# Patient Record
Sex: FEMALE | Race: WHITE | HISPANIC OR LATINO | URBAN - METROPOLITAN AREA
[De-identification: names, ages, dates, MRNs, and addresses within clinical notes are randomized per-mention and may not be internally consistent; named-entity substitution may affect disease eponyms.]

---

## 2017-02-22 ENCOUNTER — EMERGENCY (EMERGENCY)
Facility: HOSPITAL | Age: 67
LOS: 1 days | End: 2017-02-22
Payer: MEDICAID

## 2017-02-22 PROCEDURE — 71010: CPT | Mod: 26

## 2017-02-22 PROCEDURE — 72125 CT NECK SPINE W/O DYE: CPT | Mod: 26

## 2017-02-22 PROCEDURE — 70450 CT HEAD/BRAIN W/O DYE: CPT | Mod: 26

## 2017-02-22 PROCEDURE — 99285 EMERGENCY DEPT VISIT HI MDM: CPT

## 2017-02-23 ENCOUNTER — OUTPATIENT (OUTPATIENT)
Dept: OUTPATIENT SERVICES | Facility: HOSPITAL | Age: 67
LOS: 1 days | End: 2017-02-23

## 2017-02-23 PROCEDURE — 93880 EXTRACRANIAL BILAT STUDY: CPT | Mod: 26

## 2017-07-10 ENCOUNTER — OUTPATIENT (OUTPATIENT)
Dept: OUTPATIENT SERVICES | Facility: HOSPITAL | Age: 67
LOS: 1 days | End: 2017-07-10
Payer: MEDICAID

## 2017-07-10 PROCEDURE — 76700 US EXAM ABDOM COMPLETE: CPT | Mod: 26

## 2017-08-21 ENCOUNTER — OUTPATIENT (OUTPATIENT)
Dept: OUTPATIENT SERVICES | Facility: HOSPITAL | Age: 67
LOS: 1 days | End: 2017-08-21
Payer: MEDICAID

## 2017-08-21 PROCEDURE — G0202: CPT | Mod: 26

## 2017-08-31 ENCOUNTER — OUTPATIENT (OUTPATIENT)
Dept: OUTPATIENT SERVICES | Facility: HOSPITAL | Age: 67
LOS: 1 days | End: 2017-08-31

## 2017-08-31 ENCOUNTER — INPATIENT (INPATIENT)
Facility: HOSPITAL | Age: 67
LOS: 2 days | Discharge: HOME | End: 2017-09-03
Payer: MEDICARE

## 2017-08-31 PROCEDURE — 99285 EMERGENCY DEPT VISIT HI MDM: CPT

## 2017-08-31 PROCEDURE — 71020: CPT | Mod: 26

## 2017-09-01 ENCOUNTER — OUTPATIENT (OUTPATIENT)
Dept: OUTPATIENT SERVICES | Facility: HOSPITAL | Age: 67
LOS: 1 days | End: 2017-09-01

## 2017-09-02 ENCOUNTER — OUTPATIENT (OUTPATIENT)
Dept: OUTPATIENT SERVICES | Facility: HOSPITAL | Age: 67
LOS: 1 days | End: 2017-09-02

## 2017-09-03 ENCOUNTER — OUTPATIENT (OUTPATIENT)
Dept: OUTPATIENT SERVICES | Facility: HOSPITAL | Age: 67
LOS: 1 days | End: 2017-09-03

## 2018-11-17 ENCOUNTER — EMERGENCY (EMERGENCY)
Facility: HOSPITAL | Age: 68
LOS: 1 days | End: 2018-11-17
Payer: MEDICARE

## 2018-11-17 PROCEDURE — 71045 X-RAY EXAM CHEST 1 VIEW: CPT | Mod: 26

## 2018-11-17 PROCEDURE — 93971 EXTREMITY STUDY: CPT | Mod: 26,LT

## 2018-11-17 PROCEDURE — 99285 EMERGENCY DEPT VISIT HI MDM: CPT | Mod: 25

## 2020-07-06 ENCOUNTER — APPOINTMENT (OUTPATIENT)
Dept: ULTRASOUND IMAGING | Facility: CLINIC | Age: 70
End: 2020-07-06
Payer: MEDICARE

## 2020-07-06 PROCEDURE — 76775 US EXAM ABDO BACK WALL LIM: CPT

## 2022-11-25 ENCOUNTER — APPOINTMENT (OUTPATIENT)
Dept: ULTRASOUND IMAGING | Facility: CLINIC | Age: 72
End: 2022-11-25

## 2022-11-25 ENCOUNTER — APPOINTMENT (OUTPATIENT)
Dept: MAMMOGRAPHY | Facility: CLINIC | Age: 72
End: 2022-11-25

## 2022-11-25 PROCEDURE — 77067 SCR MAMMO BI INCL CAD: CPT

## 2022-11-25 PROCEDURE — 76641 ULTRASOUND BREAST COMPLETE: CPT | Mod: 50

## 2022-11-25 PROCEDURE — 77063 BREAST TOMOSYNTHESIS BI: CPT

## 2023-05-26 ENCOUNTER — APPOINTMENT (OUTPATIENT)
Dept: ULTRASOUND IMAGING | Facility: CLINIC | Age: 73
End: 2023-05-26
Payer: MEDICARE

## 2023-05-26 PROCEDURE — 76642 ULTRASOUND BREAST LIMITED: CPT | Mod: RT

## 2023-06-08 ENCOUNTER — RX ONLY (RX ONLY)
Age: 73
End: 2023-06-08

## 2023-06-08 ENCOUNTER — OFFICE (OUTPATIENT)
Dept: URBAN - METROPOLITAN AREA CLINIC 38 | Facility: CLINIC | Age: 73
Setting detail: OPHTHALMOLOGY
End: 2023-06-08
Payer: MEDICAID

## 2023-06-08 VITALS — HEIGHT: 60 IN | BODY MASS INDEX: 26.31 KG/M2 | WEIGHT: 134 LBS

## 2023-06-08 DIAGNOSIS — H17.9: ICD-10-CM

## 2023-06-08 DIAGNOSIS — Z96.1: ICD-10-CM

## 2023-06-08 DIAGNOSIS — E11.3293: ICD-10-CM

## 2023-06-08 DIAGNOSIS — H11.042: ICD-10-CM

## 2023-06-08 DIAGNOSIS — H04.123: ICD-10-CM

## 2023-06-08 DIAGNOSIS — H26.491: ICD-10-CM

## 2023-06-08 DIAGNOSIS — H35.033: ICD-10-CM

## 2023-06-08 PROCEDURE — 92014 COMPRE OPH EXAM EST PT 1/>: CPT | Performed by: OPHTHALMOLOGY

## 2023-06-08 ASSESSMENT — REFRACTION_MANIFEST
OD_VA2: 20/25(J1)
OS_VA2: 20/25(J1)
OD_AXIS: 180
OS_AXIS: 170
OS_SPHERE: +0.50
OS_CYLINDER: -1.25
OS_VA2: 20/25(J1)
OD_CYLINDER: -1.75
OD_CYLINDER: -1.75
OU_VA: 20/20-
OS_VA1: 20/25-
OD_VA1: 20/40+2
OS_CYLINDER: -1.25
OD_ADD: +2.75
OD_SPHERE: +0.50
OD_ADD: +2.50
OS_SPHERE: +0.50
OS_ADD: +2.75
OS_AXIS: 175
OD_VA2: 20/25(J1)
OD_SPHERE: +0.75
OS_VA1: 20/25
OS_ADD: +2.50
OD_VA1: 20/40-
OD_AXIS: 005

## 2023-06-08 ASSESSMENT — CONFRONTATIONAL VISUAL FIELD TEST (CVF)
OS_FINDINGS: FULL
OD_FINDINGS: FULL

## 2023-06-08 ASSESSMENT — REFRACTION_CURRENTRX
OD_SPHERE: +0.50
OS_SPHERE: +0.50
OD_OVR_VA: 20/
OD_ADD: +2.75
OS_ADD: +2.75
OD_VPRISM_DIRECTION: BF
OD_CYLINDER: -1.75
OS_OVR_VA: 20/
OS_VPRISM_DIRECTION: BF
OS_AXIS: 178
OS_CYLINDER: -1.25
OD_AXIS: 010

## 2023-06-08 ASSESSMENT — REFRACTION_AUTOREFRACTION
OS_AXIS: 169
OD_SPHERE: +0.75
OD_CYLINDER: -2.50
OS_SPHERE: +1.00
OS_CYLINDER: -2.00
OD_AXIS: 180

## 2023-06-08 ASSESSMENT — KERATOMETRY
OD_AXISANGLE_DEGREES: 096
OS_K1POWER_DIOPTERS: 42.50
METHOD_AUTO_MANUAL: AUTO
OS_AXISANGLE_DEGREES: 082
OD_K1POWER_DIOPTERS: 42.75
OS_K2POWER_DIOPTERS: 44.75
OD_K2POWER_DIOPTERS: 45.00

## 2023-06-08 ASSESSMENT — SPHEQUIV_DERIVED
OS_SPHEQUIV: -0.125
OS_SPHEQUIV: -0.125
OD_SPHEQUIV: -0.125
OD_SPHEQUIV: -0.5
OD_SPHEQUIV: -0.375
OS_SPHEQUIV: 0

## 2023-06-08 ASSESSMENT — CORNEAL PTERYGIUM: OS_PTERYGIUM: NASAL 2MM 1MM

## 2023-06-08 ASSESSMENT — VISUAL ACUITY
OS_BCVA: 20/40
OD_BCVA: 20/25-

## 2023-06-08 ASSESSMENT — AXIALLENGTH_DERIVED
OD_AL: 23.6003
OS_AL: 23.5947
OS_AL: 23.5947
OS_AL: 23.5461
OD_AL: 23.5032
OD_AL: 23.6491

## 2023-06-08 ASSESSMENT — CORNEAL SURGICAL SCARRING: OS_SCARRING: ENDOTHELIAL

## 2023-06-08 ASSESSMENT — SUPERFICIAL PUNCTATE KERATITIS (SPK)
OS_SPK: T 1+
OD_SPK: T 1+

## 2023-07-07 ENCOUNTER — OFFICE (OUTPATIENT)
Dept: URBAN - METROPOLITAN AREA CLINIC 38 | Facility: CLINIC | Age: 73
Setting detail: OPHTHALMOLOGY
End: 2023-07-07
Payer: MEDICAID

## 2023-07-07 DIAGNOSIS — H01.004: ICD-10-CM

## 2023-07-07 DIAGNOSIS — H01.001: ICD-10-CM

## 2023-07-07 DIAGNOSIS — Z96.1: ICD-10-CM

## 2023-07-07 DIAGNOSIS — H16.223: ICD-10-CM

## 2023-07-07 DIAGNOSIS — H52.4: ICD-10-CM

## 2023-07-07 DIAGNOSIS — H11.042: ICD-10-CM

## 2023-07-07 PROCEDURE — 99213 OFFICE O/P EST LOW 20 MIN: CPT

## 2023-07-07 PROCEDURE — 92015 DETERMINE REFRACTIVE STATE: CPT

## 2023-07-07 ASSESSMENT — SPHEQUIV_DERIVED
OD_SPHEQUIV: -0.375
OS_SPHEQUIV: -0.375
OS_SPHEQUIV: -0.375
OD_SPHEQUIV: -0.875
OD_SPHEQUIV: -0.375
OS_SPHEQUIV: -0.25

## 2023-07-07 ASSESSMENT — REFRACTION_MANIFEST
OS_SPHERE: +0.25
OS_VA2: 20/25(J1)
OD_VA2: 20/25(J1)
OS_AXIS: 175
OD_AXIS: 180
OU_VA: 20/30+2
OD_ADD: +2.75
OD_SPHERE: +0.50
OS_SPHERE: +0.25
OD_CYLINDER: -1.75
OS_ADD: +2.75
OD_ADD: +2.75
OS_CYLINDER: -1.25
OU_VA: 20/30+2
OD_VA2: 20/25(J1)
OD_VA1: 20/40
OS_VA1: 20/30-2
OS_AXIS: 175
OD_SPHERE: +0.50
OS_ADD: +2.75
OS_VA1: 20/30-2
OS_VA2: 20/25(J1)
OD_AXIS: 180
OS_CYLINDER: -1.25
OD_CYLINDER: -1.75
OD_VA1: 20/40

## 2023-07-07 ASSESSMENT — REFRACTION_AUTOREFRACTION
OD_AXIS: 180
OS_CYLINDER: -2.00
OS_AXIS: 173
OD_CYLINDER: -2.75
OD_SPHERE: +0.50
OS_SPHERE: +0.75

## 2023-07-07 ASSESSMENT — SUPERFICIAL PUNCTATE KERATITIS (SPK)
OS_SPK: T 1+
OD_SPK: T 1+

## 2023-07-07 ASSESSMENT — AXIALLENGTH_DERIVED
OS_AL: 23.8323
OD_AL: 23.7502
OD_AL: 23.5544
OS_AL: 23.8323
OS_AL: 23.7827
OD_AL: 23.5544

## 2023-07-07 ASSESSMENT — TONOMETRY
OD_IOP_MMHG: 18
OS_IOP_MMHG: 20

## 2023-07-07 ASSESSMENT — TEAR BREAK UP TIME (TBUT)
OS_TBUT: 10 SEC
OD_TBUT: 10 SEC

## 2023-07-07 ASSESSMENT — CORNEAL SURGICAL SCARRING: OS_SCARRING: ENDOTHELIAL

## 2023-07-07 ASSESSMENT — KERATOMETRY
OS_K2POWER_DIOPTERS: 44.00
METHOD_AUTO_MANUAL: AUTO
OD_AXISANGLE_DEGREES: 093
OS_K1POWER_DIOPTERS: 42.50
OD_K2POWER_DIOPTERS: 45.25
OD_K1POWER_DIOPTERS: 42.75
OS_AXISANGLE_DEGREES: 081

## 2023-07-07 ASSESSMENT — REFRACTION_CURRENTRX
OD_CYLINDER: -1.75
OS_OVR_VA: 20/
OS_AXIS: 179
OD_SPHERE: +0.75
OD_ADD: +2.75
OS_CYLINDER: -1.25
OD_AXIS: 014
OS_ADD: +2.75
OD_OVR_VA: 20/
OD_VPRISM_DIRECTION: BF
OS_VPRISM_DIRECTION: BF
OS_SPHERE: +0.50

## 2023-07-07 ASSESSMENT — VISUAL ACUITY
OS_BCVA: 20/40-
OD_BCVA: 20/30-2

## 2023-07-07 ASSESSMENT — CONFRONTATIONAL VISUAL FIELD TEST (CVF)
OD_FINDINGS: FULL
OS_FINDINGS: FULL

## 2023-07-07 ASSESSMENT — LID EXAM ASSESSMENTS
OD_BLEPHARITIS: RUL 1+ 2+
OS_BLEPHARITIS: LUL 1+ 2+
OD_COMMENTS: MGD 1+ 2+
OS_COMMENTS: MGD 1+ 2+

## 2023-07-07 ASSESSMENT — CORNEAL PTERYGIUM: OS_PTERYGIUM: NASAL 2MM 1MM

## 2023-07-31 PROBLEM — Z00.00 ENCOUNTER FOR PREVENTIVE HEALTH EXAMINATION: Status: ACTIVE | Noted: 2023-07-31

## 2023-08-02 ENCOUNTER — APPOINTMENT (OUTPATIENT)
Dept: INFECTIOUS DISEASE | Facility: CLINIC | Age: 73
End: 2023-08-02
Payer: MEDICARE

## 2023-08-02 ENCOUNTER — LABORATORY RESULT (OUTPATIENT)
Age: 73
End: 2023-08-02

## 2023-08-02 VITALS
TEMPERATURE: 98.2 F | OXYGEN SATURATION: 77 % | HEIGHT: 59 IN | WEIGHT: 150 LBS | BODY MASS INDEX: 30.24 KG/M2 | HEART RATE: 50 BPM | SYSTOLIC BLOOD PRESSURE: 132 MMHG | DIASTOLIC BLOOD PRESSURE: 58 MMHG

## 2023-08-02 DIAGNOSIS — B60.00 BABESIOSIS, UNSPECIFIED: ICD-10-CM

## 2023-08-02 DIAGNOSIS — Z78.9 OTHER SPECIFIED HEALTH STATUS: ICD-10-CM

## 2023-08-02 DIAGNOSIS — E78.49 OTHER HYPERLIPIDEMIA: ICD-10-CM

## 2023-08-02 DIAGNOSIS — I10 ESSENTIAL (PRIMARY) HYPERTENSION: ICD-10-CM

## 2023-08-02 DIAGNOSIS — Z86.39 PERSONAL HISTORY OF OTHER ENDOCRINE, NUTRITIONAL AND METABOLIC DISEASE: ICD-10-CM

## 2023-08-02 PROCEDURE — 99215 OFFICE O/P EST HI 40 MIN: CPT

## 2023-08-02 NOTE — PHYSICAL EXAM
[General Appearance - Alert] : alert [General Appearance - In No Acute Distress] : in no acute distress [General Appearance - Well Nourished] : well nourished [Extraocular Movements] : extraocular movements were intact [Hearing Threshold Finger Rub Not [place]] : hearing was normal [Neck Appearance] : the appearance of the neck was normal [Edema] : there was no peripheral edema [Abdomen Soft] : soft [FreeTextEntry1] : no suprapubic or CVA tenderness [No Palpable Adenopathy] : no palpable adenopathy [Musculoskeletal - Swelling] : no joint swelling [] : no rash [No Focal Deficits] : no focal deficits [Affect] : the affect was normal

## 2023-08-02 NOTE — REVIEW OF SYSTEMS
[Fever] : no fever [Feeling Tired] : feeling tired [Red Eyes] : eyes not red [Loss Of Hearing] : no hearing loss [Palpitations] : no palpitations [Shortness Of Breath] : no shortness of breath [Vomiting] : vomiting [Dysuria] : no dysuria [Joint Pain] : no joint pain [Skin Lesions] : no skin lesions [Confused] : no confusion [FreeTextEntry7] : nausea

## 2023-08-02 NOTE — ASSESSMENT
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

## 2023-08-02 NOTE — HISTORY OF PRESENT ILLNESS
[FreeTextEntry1] : Hospital follow up Was seen at [facility] by Dr Vo for Babesiosis, parasitemia 1-2% Completed treatment Atovaquone Azithromycin and Doxycycline for co-infection other tick borne workup negative Lyme Ehrlichia Anaplasma ESBL E coli in urine---asymptomatic, not treated She is not having any urinary complaints Some nausea and vomiting with the medications--due to be finished today No other complaints at this time  Interviewed with  and also some interview in English by me with  on the line  All questions answered

## 2023-08-07 PROBLEM — E78.49 OTHER HYPERLIPIDEMIA: Status: ACTIVE | Noted: 2023-08-02

## 2023-08-07 PROBLEM — I10 HYPERTENSION, UNSPECIFIED TYPE: Status: ACTIVE | Noted: 2023-08-02

## 2023-08-07 PROBLEM — Z86.39 HISTORY OF DIABETES MELLITUS: Status: RESOLVED | Noted: 2023-08-07 | Resolved: 2023-08-07

## 2023-08-07 PROBLEM — Z78.9 NEVER SMOKED CIGARETTES: Status: ACTIVE | Noted: 2023-08-07

## 2023-08-07 PROBLEM — Z78.9 FAMILY HISTORY UNKNOWN: Status: ACTIVE | Noted: 2023-08-07

## 2023-08-09 LAB
ALBUMIN SERPL ELPH-MCNC: 4 G/DL
ALP BLD-CCNC: 76 U/L
ALT SERPL-CCNC: 25 U/L
ANION GAP SERPL CALC-SCNC: 15 MMOL/L
AST SERPL-CCNC: 33 U/L
BASOPHILS # BLD AUTO: 0 K/UL
BASOPHILS NFR BLD AUTO: 0 %
BILIRUB SERPL-MCNC: 0.7 MG/DL
BUN SERPL-MCNC: 25 MG/DL
CALCIUM SERPL-MCNC: 9.4 MG/DL
CHLORIDE SERPL-SCNC: 105 MMOL/L
CO2 SERPL-SCNC: 22 MMOL/L
CREAT SERPL-MCNC: 1.25 MG/DL
EGFR: 46 ML/MIN/1.73M2
EOSINOPHIL # BLD AUTO: 0.05 K/UL
EOSINOPHIL NFR BLD AUTO: 0.9 %
GLUCOSE SERPL-MCNC: 369 MG/DL
HCT VFR BLD CALC: 28.2 %
HGB BLD-MCNC: 8.5 G/DL
LYMPHOCYTES # BLD AUTO: 0.87 K/UL
LYMPHOCYTES NFR BLD AUTO: 15.8 %
MAN DIFF?: NORMAL
MCHC RBC-ENTMCNC: 30.1 GM/DL
MCHC RBC-ENTMCNC: 32.4 PG
MCV RBC AUTO: 107.6 FL
MONOCYTES # BLD AUTO: 0.43 K/UL
MONOCYTES NFR BLD AUTO: 7.9 %
NEUTROPHILS # BLD AUTO: 4.09 K/UL
NEUTROPHILS NFR BLD AUTO: 74.5 %
PLATELET # BLD AUTO: 221 K/UL
POTASSIUM SERPL-SCNC: 4.8 MMOL/L
PROT SERPL-MCNC: 7.4 G/DL
RBC # BLD: 2.62 M/UL
RBC # FLD: 19 %
SODIUM SERPL-SCNC: 142 MMOL/L
WBC # FLD AUTO: 5.49 K/UL

## 2023-12-05 ENCOUNTER — APPOINTMENT (OUTPATIENT)
Dept: ULTRASOUND IMAGING | Facility: CLINIC | Age: 73
End: 2023-12-05
Payer: MEDICARE

## 2023-12-05 ENCOUNTER — APPOINTMENT (OUTPATIENT)
Dept: MAMMOGRAPHY | Facility: CLINIC | Age: 73
End: 2023-12-05
Payer: MEDICARE

## 2023-12-05 PROCEDURE — G0279: CPT

## 2023-12-05 PROCEDURE — 76641 ULTRASOUND BREAST COMPLETE: CPT | Mod: 50

## 2023-12-05 PROCEDURE — 77066 DX MAMMO INCL CAD BI: CPT

## 2023-12-09 ENCOUNTER — APPOINTMENT (OUTPATIENT)
Dept: MAMMOGRAPHY | Facility: CLINIC | Age: 73
End: 2023-12-09

## 2024-04-25 ENCOUNTER — APPOINTMENT (OUTPATIENT)
Dept: ULTRASOUND IMAGING | Facility: CLINIC | Age: 74
End: 2024-04-25
Payer: MEDICARE

## 2024-04-25 PROCEDURE — 76642 ULTRASOUND BREAST LIMITED: CPT | Mod: RT

## 2024-04-26 PROBLEM — Z00.00 ENCOUNTER FOR PREVENTIVE HEALTH EXAMINATION: Status: ACTIVE | Noted: 2024-04-26

## 2024-08-09 ENCOUNTER — OFFICE (OUTPATIENT)
Dept: URBAN - METROPOLITAN AREA CLINIC 38 | Facility: CLINIC | Age: 74
Setting detail: OPHTHALMOLOGY
End: 2024-08-09
Payer: MEDICARE

## 2024-08-09 DIAGNOSIS — H01.004: ICD-10-CM

## 2024-08-09 DIAGNOSIS — H16.223: ICD-10-CM

## 2024-08-09 DIAGNOSIS — E11.3212: ICD-10-CM

## 2024-08-09 DIAGNOSIS — H01.001: ICD-10-CM

## 2024-08-09 DIAGNOSIS — H52.4: ICD-10-CM

## 2024-08-09 PROBLEM — H26.493 POSTERIOR CAPSULAR OPACIFICATION; BOTH EYES: Status: ACTIVE | Noted: 2024-08-09

## 2024-08-09 PROBLEM — E11.3511 DM TYPE 2; RIGHT PROLIFERATIVE WITH ME, LEFT MILD WITH ME: Status: ACTIVE | Noted: 2024-08-09

## 2024-08-09 PROCEDURE — 92134 CPTRZ OPH DX IMG PST SGM RTA: CPT

## 2024-08-09 PROCEDURE — 92014 COMPRE OPH EXAM EST PT 1/>: CPT

## 2024-08-09 PROCEDURE — 92015 DETERMINE REFRACTIVE STATE: CPT

## 2024-08-09 ASSESSMENT — CONFRONTATIONAL VISUAL FIELD TEST (CVF)
OS_FINDINGS: FULL
OD_FINDINGS: FULL

## 2024-08-09 ASSESSMENT — LID EXAM ASSESSMENTS
OD_COMMENTS: MGD 1+ 2+
OS_COMMENTS: MGD 1+ 2+
OS_BLEPHARITIS: LUL 1+ 2+
OD_BLEPHARITIS: RUL 1+ 2+

## 2024-11-26 ENCOUNTER — APPOINTMENT (OUTPATIENT)
Dept: MAMMOGRAPHY | Facility: CLINIC | Age: 74
End: 2024-11-26
Payer: MEDICARE

## 2024-11-26 ENCOUNTER — APPOINTMENT (OUTPATIENT)
Dept: ULTRASOUND IMAGING | Facility: CLINIC | Age: 74
End: 2024-11-26

## 2024-11-26 PROCEDURE — 77066 DX MAMMO INCL CAD BI: CPT

## 2024-11-26 PROCEDURE — 76641 ULTRASOUND BREAST COMPLETE: CPT | Mod: 50

## 2024-11-26 PROCEDURE — G0279: CPT

## 2025-02-05 ENCOUNTER — OFFICE (OUTPATIENT)
Dept: URBAN - METROPOLITAN AREA CLINIC 38 | Facility: CLINIC | Age: 75
Setting detail: OPHTHALMOLOGY
End: 2025-02-05
Payer: MEDICARE

## 2025-02-05 DIAGNOSIS — H43.11: ICD-10-CM

## 2025-02-05 PROCEDURE — 92134 CPTRZ OPH DX IMG PST SGM RTA: CPT

## 2025-02-05 PROCEDURE — 99213 OFFICE O/P EST LOW 20 MIN: CPT

## 2025-02-05 ASSESSMENT — REFRACTION_CURRENTRX
OS_OVR_VA: 20/
OD_OVR_VA: 20/
OD_CYLINDER: -2.00
OS_VPRISM_DIRECTION: BF
OD_AXIS: 004
OS_AXIS: 179
OS_SPHERE: +0.25
OS_CYLINDER: -1.25
OD_ADD: +3.00
OD_SPHERE: +0.50
OD_VPRISM_DIRECTION: BF
OS_ADD: +3.00

## 2025-02-05 ASSESSMENT — TEAR BREAK UP TIME (TBUT)
OS_TBUT: 10 SEC
OD_TBUT: 10 SEC

## 2025-02-05 ASSESSMENT — REFRACTION_MANIFEST
OS_ADD: +3.00
OD_SPHERE: +1.25
OS_VA2: 20/25(J1)
OU_VA: 20/30-1
OD_VA1: 20/40-2
OD_VA2: 20/25(J1)
OD_VA2: 20/25(J1)
OS_CYLINDER: -1.25
OD_SPHERE: +0.50
OD_AXIS: 180
OS_SPHERE: +0.75
OS_CYLINDER: -1.75
OD_ADD: +3.00
OU_VA: 20/30-1
OD_VA1: 20/40-2
OS_SPHERE: +0.50
OS_ADD: +3.00
OD_CYLINDER: -1.75
OS_VA1: 20/30
OS_VA1: 20/30
OS_VA2: 20/25(J1)
OD_AXIS: 180
OS_AXIS: 180
OD_CYLINDER: -1.75
OS_AXIS: 180
OD_ADD: +3.00

## 2025-02-05 ASSESSMENT — REFRACTION_AUTOREFRACTION
OS_CYLINDER: -1.00
OS_SPHERE: +1.00
OD_AXIS: 101
OD_SPHERE: +23.00
OD_CYLINDER: -0.25
OS_AXIS: 159

## 2025-02-05 ASSESSMENT — CONFRONTATIONAL VISUAL FIELD TEST (CVF)
OS_FINDINGS: FULL
OD_FINDINGS: CONSTRICTION

## 2025-02-05 ASSESSMENT — KERATOMETRY
OD_AXISANGLE_DEGREES: 089
OD_K2POWER_DIOPTERS: 44.00
OS_K1POWER_DIOPTERS: 43.75
OD_K1POWER_DIOPTERS: 42.00
METHOD_AUTO_MANUAL: AUTO
OS_K2POWER_DIOPTERS: 44.75
OS_AXISANGLE_DEGREES: 084

## 2025-02-05 ASSESSMENT — SUPERFICIAL PUNCTATE KERATITIS (SPK)
OD_SPK: T 1+
OS_SPK: T 1+

## 2025-02-05 ASSESSMENT — LID EXAM ASSESSMENTS
OD_BLEPHARITIS: RUL 1+ 2+
OD_COMMENTS: MGD 1+ 2+
OS_BLEPHARITIS: LUL 1+ 2+
OS_COMMENTS: MGD 1+ 2+

## 2025-02-05 ASSESSMENT — CORNEAL SURGICAL SCARRING: OS_SCARRING: ENDOTHELIAL

## 2025-02-05 ASSESSMENT — VISUAL ACUITY
OD_BCVA: 20/30-1
OS_BCVA: HM 1FT

## 2025-02-05 ASSESSMENT — CORNEAL PTERYGIUM: OS_PTERYGIUM: NASAL 2MM 1MM

## 2025-04-29 RX ORDER — DOCUSATE SODIUM 100 MG
1 CAPSULE ORAL
Refills: 0 | DISCHARGE
Start: 2025-04-29

## 2025-05-06 ENCOUNTER — OUTPATIENT (OUTPATIENT)
Dept: OUTPATIENT SERVICES | Facility: HOSPITAL | Age: 75
LOS: 1 days | End: 2025-05-06
Payer: MEDICARE

## 2025-05-06 VITALS
SYSTOLIC BLOOD PRESSURE: 152 MMHG | OXYGEN SATURATION: 99 % | TEMPERATURE: 98 F | HEIGHT: 58 IN | WEIGHT: 131.4 LBS | HEART RATE: 73 BPM | DIASTOLIC BLOOD PRESSURE: 47 MMHG | RESPIRATION RATE: 22 BRPM

## 2025-05-06 VITALS
RESPIRATION RATE: 19 BRPM | OXYGEN SATURATION: 100 % | DIASTOLIC BLOOD PRESSURE: 67 MMHG | HEART RATE: 70 BPM | SYSTOLIC BLOOD PRESSURE: 134 MMHG

## 2025-05-06 DIAGNOSIS — H43.11 VITREOUS HEMORRHAGE, RIGHT EYE: ICD-10-CM

## 2025-05-06 DIAGNOSIS — Z98.49 CATARACT EXTRACTION STATUS, UNSPECIFIED EYE: Chronic | ICD-10-CM

## 2025-05-06 DIAGNOSIS — H33.41 TRACTION DETACHMENT OF RETINA, RIGHT EYE: ICD-10-CM

## 2025-05-06 LAB — GLUCOSE BLDC GLUCOMTR-MCNC: 215 MG/DL — HIGH (ref 70–99)

## 2025-05-06 PROCEDURE — 67040 LASER TREATMENT OF RETINA: CPT | Mod: AS,RT

## 2025-05-06 PROCEDURE — 67040 LASER TREATMENT OF RETINA: CPT | Mod: RT

## 2025-05-06 PROCEDURE — C1889: CPT

## 2025-05-06 PROCEDURE — 82962 GLUCOSE BLOOD TEST: CPT

## 2025-05-06 DEVICE — LASER PROBE 25G CONSTELLATION: Type: IMPLANTABLE DEVICE | Site: RIGHT | Status: FUNCTIONAL

## 2025-05-06 RX ORDER — EMPAGLIFLOZIN 25 MG/1
1 TABLET, FILM COATED ORAL
Refills: 0 | DISCHARGE

## 2025-05-06 RX ORDER — GLIMEPIRIDE 4 MG/1
1 TABLET ORAL
Refills: 0 | DISCHARGE

## 2025-05-06 RX ORDER — PIOGLITAZONE HYDROCHLORIDE 15 MG/1
1 TABLET ORAL
Refills: 0 | DISCHARGE

## 2025-05-06 RX ORDER — ORAL SEMAGLUTIDE 14 MG/1
1 TABLET ORAL
Refills: 0 | DISCHARGE

## 2025-05-06 RX ORDER — OFLOXACIN 3 MG/ML
1 SOLUTION OPHTHALMIC
Refills: 0 | DISCHARGE

## 2025-05-06 RX ORDER — LOSARTAN POTASSIUM 100 MG/1
1 TABLET, FILM COATED ORAL
Refills: 0 | DISCHARGE

## 2025-05-06 RX ORDER — CYCLOSPORINE OPHTHALMIC SOLUTION 1 MG/ML
1 SOLUTION/ DROPS OPHTHALMIC
Refills: 0 | DISCHARGE

## 2025-05-06 RX ORDER — FERROUS SULFATE 137(45) MG
1 TABLET, EXTENDED RELEASE ORAL
Refills: 0 | DISCHARGE

## 2025-05-06 RX ORDER — FERRIC MALTOL 30 MG/1
1 CAPSULE ORAL
Refills: 0 | DISCHARGE

## 2025-05-06 RX ORDER — ATORVASTATIN CALCIUM 80 MG/1
1 TABLET, FILM COATED ORAL
Refills: 0 | DISCHARGE

## 2025-05-06 NOTE — ASU PATIENT PROFILE, ADULT - TEACHING/LEARNING FACTORS IMPACT ABILITY TO LEARN
Patient speaks Bruneian , use language line solutions for translation./language/visual problems Patient speaks Irish , use language line solutions for translation. Right eye poor vision./language/visual problems

## 2025-05-06 NOTE — ASU DISCHARGE PLAN (ADULT/PEDIATRIC) - ASU DC SPECIAL INSTRUCTIONSFT
keep head elevated, do not lay flat on your back. no drops needed in the right eye today, do not remove patch

## 2025-05-06 NOTE — ASU PATIENT PROFILE, ADULT - FALL HARM RISK - HARM RISK INTERVENTIONS

## 2025-05-06 NOTE — ASU DISCHARGE PLAN (ADULT/PEDIATRIC) - PROVIDER TOKENS
FREE:[LAST:[Ginny],FIRST:[Sukhdev Felipe],PHONE:[phone]],FAX:[(   )    -],ADDRESS:[[address] [place] [address],   [place] [id number]],SCHEDULEDAPPT:[05/07/2025],SCHEDULEDAPPTTIME:[11:45 AM],ESTABLISHEDPATIENT:[T]]

## 2025-05-06 NOTE — ASU DISCHARGE PLAN (ADULT/PEDIATRIC) - FINANCIAL ASSISTANCE
[facility] provides services at a reduced cost to those who are determined to be eligible through [facility]’s financial assistance program. Information regarding [facility]’s financial assistance program can be found by going to [email or web address] or by calling [phone].

## 2025-05-06 NOTE — ASU DISCHARGE PLAN (ADULT/PEDIATRIC) - PATIENT EDUCATION MATERIALS PROVIED
Eye shield with instructions , sunglasses and eye kit given to patient./Provider pre-printed instructions given/Other (specify)

## 2025-05-06 NOTE — ASU DISCHARGE PLAN (ADULT/PEDIATRIC) - DIET/FLUID RESTRICTION
Attempted to call patient to offer an appointment. Call sent straight to voicemail. Writer unable to leave message.    Patient is difficult to get a hold of. If she calls back, please offer an appointment with Dr. LEANNA Evans on 5/3 or 5/10 at 8:00 am. Reach out to RN for alternate appointment times if needed.   Progress slowly

## 2025-05-06 NOTE — ASU DISCHARGE PLAN (ADULT/PEDIATRIC) - CARE PROVIDER_API CALL
Sukhdev Gross  [address]  [address],   [facility] [address]  Phone: [phone]  Fax: (   )    -  Established Patient  Scheduled Appointment: 05/07/2025 11:45 AM

## 2025-05-06 NOTE — ASU PATIENT PROFILE, ADULT - NSICDXPASTMEDICALHX_GEN_ALL_CORE_FT
PAST MEDICAL HISTORY:  H/O babesiosis     H/O thrombocytopenia     HLD (hyperlipidemia)     HTN (hypertension)     Type 2 diabetes mellitus

## 2025-05-06 NOTE — ASU DISCHARGE PLAN (ADULT/PEDIATRIC) - D. IF YOU HAD ANY TYPE OF SEDATION, YOU MAY EXPERIENCE LIGHTHEADEDNESS, DIZZINESS, OR SLEEPINESS FOLLOWING YOUR PROCEDURE. A RESPONSIBLE ADULT SHOULD STAY WITH YOU FOR AT LEAST 24 HOURS FOLLOWING YOUR PROCEDURE.
Statement Selected
You were seen in the emergency room today. Please call your primary doctor to inform them of this ER visit and obtain the next available appointment within the next 5 days. As we discussed, return to the ER if you have any worsening symptoms.    We no longer feel that you need further emergency care or admission to the hospital at this time.    While we have determined that you are currently stable for discharge, we know that things can change. Please seek immediate medical attention or return to the ER if you experience any of the following:  Any worsening or persistent symptoms  Severe Pain  Chest Pain  Difficulty Breathing  Bleeding  Passing Out  Severe Rash  Inability to Eat or Drink  Persistent Fever    Please see a primary care doctor or specialist within 5 days to ensure that you are improving.    Please call the [facility] phone numbers on this document if you have any problems obtaining a follow up appointment.    I wish you well! -Dr Bryson

## 2025-05-06 NOTE — ASU PATIENT PROFILE, ADULT - VISION (WITH CORRECTIVE LENSES IF THE PATIENT USUALLY WEARS THEM):
Partially impaired: cannot see medication labels or newsprint, but can see obstacles in path, and the surrounding layout; can count fingers at arm's length Right eye poor vision./Partially impaired: cannot see medication labels or newsprint, but can see obstacles in path, and the surrounding layout; can count fingers at arm's length

## 2025-05-06 NOTE — ASU PATIENT PROFILE, ADULT - NSICDXPASTSURGICALHX_GEN_ALL_CORE_FT
PAST SURGICAL HISTORY:  No significant past surgical history PAST SURGICAL HISTORY:  S/P cataract surgery Bilateral.

## 2025-08-08 ENCOUNTER — OFFICE (OUTPATIENT)
Dept: URBAN - METROPOLITAN AREA CLINIC 38 | Facility: CLINIC | Age: 75
Setting detail: OPHTHALMOLOGY
End: 2025-08-08
Payer: MEDICARE

## 2025-08-08 DIAGNOSIS — H35.033: ICD-10-CM

## 2025-08-08 DIAGNOSIS — E11.3213: ICD-10-CM

## 2025-08-08 DIAGNOSIS — H26.493: ICD-10-CM

## 2025-08-08 DIAGNOSIS — H52.4: ICD-10-CM

## 2025-08-08 PROBLEM — H43.812 POSTERIOR VITREOUS DETACHMENT; LEFT EYE: Status: ACTIVE | Noted: 2025-08-08

## 2025-08-08 PROBLEM — H02.831 DERMATOCHALASIS; RIGHT UPPER LID, LEFT UPPER LID: Status: ACTIVE | Noted: 2025-08-08

## 2025-08-08 PROBLEM — H02.834 DERMATOCHALASIS; RIGHT UPPER LID, LEFT UPPER LID: Status: ACTIVE | Noted: 2025-08-08

## 2025-08-08 PROBLEM — E11.3312 DM TYPE 2; RIGHT PROLIFERATIVE WITH ME, LEFT MOD WITH ME: Status: ACTIVE | Noted: 2025-08-08

## 2025-08-08 PROBLEM — E11.3511 DM TYPE 2; RIGHT PROLIFERATIVE WITH ME, LEFT MOD WITH ME: Status: ACTIVE | Noted: 2025-08-08

## 2025-08-08 PROCEDURE — 92134 CPTRZ OPH DX IMG PST SGM RTA: CPT

## 2025-08-08 PROCEDURE — 99213 OFFICE O/P EST LOW 20 MIN: CPT

## 2025-08-08 PROCEDURE — 92015 DETERMINE REFRACTIVE STATE: CPT

## 2025-08-08 ASSESSMENT — REFRACTION_MANIFEST
OS_AXIS: 180
OD_VA1: 20/40-2
OS_SPHERE: +0.75
OS_ADD: +3.00
OD_VA2: 20/25(J1)
OS_ADD: +3.00
OD_VA2: 20/25(J1)
OU_VA: 20/30-1
OD_AXIS: 180
OD_SPHERE: +0.50
OS_VA2: 20/25(J1)
OS_CYLINDER: -1.25
OD_CYLINDER: -1.75
OD_AXIS: 180
OS_AXIS: 180
OS_VA2: 20/25(J1)
OS_CYLINDER: -1.25
OS_VA1: 20/30
OD_VA1: 20/60+3
OU_VA: 20/30-2
OD_ADD: +3.00
OD_SPHERE: +0.50
OS_SPHERE: +0.75
OD_ADD: +3.00
OD_CYLINDER: -1.75
OS_VA1: 20/40

## 2025-08-08 ASSESSMENT — KERATOMETRY
OS_K2POWER_DIOPTERS: 43.75
OD_K1POWER_DIOPTERS: 42.75
OS_K1POWER_DIOPTERS: 42.50
OS_AXISANGLE_DEGREES: 073
METHOD_AUTO_MANUAL: AUTO
OD_K2POWER_DIOPTERS: 44.50
OD_AXISANGLE_DEGREES: 095

## 2025-08-08 ASSESSMENT — REFRACTION_CURRENTRX
OD_SPHERE: +0.50
OS_VPRISM_DIRECTION: PROGS
OS_CYLINDER: -1.25
OD_OVR_VA: 20/
OS_SPHERE: +0.25
OS_AXIS: 171
OS_ADD: +3.00
OS_OVR_VA: 20/
OD_VPRISM_DIRECTION: BF
OD_CYLINDER: -1.75
OS_CYLINDER: -1.75
OS_SPHERE: +0.50
OD_VPRISM_DIRECTION: PROGS
OS_ADD: +3.00
OD_ADD: +3.00
OD_AXIS: 004
OD_AXIS: 173
OS_VPRISM_DIRECTION: BF
OD_OVR_VA: 20/
OS_AXIS: 179
OD_SPHERE: +0.50
OS_OVR_VA: 20/
OD_ADD: +3.00
OD_CYLINDER: -2.00

## 2025-08-08 ASSESSMENT — REFRACTION_AUTOREFRACTION
OD_AXIS: 006
OS_SPHERE: +1.50
OD_CYLINDER: -1.50
OS_CYLINDER: -1.50
OS_AXIS: 169
OD_SPHERE: +0.75

## 2025-08-08 ASSESSMENT — CONFRONTATIONAL VISUAL FIELD TEST (CVF)
OD_FINDINGS: FULL
OS_FINDINGS: FULL

## 2025-08-08 ASSESSMENT — CORNEAL SURGICAL SCARRING: OS_SCARRING: ENDOTHELIAL

## 2025-08-08 ASSESSMENT — LID POSITION - DERMATOCHALASIS
OD_DERMATOCHALASIS: RUL
OS_DERMATOCHALASIS: LUL

## 2025-08-08 ASSESSMENT — LID EXAM ASSESSMENTS
OS_BLEPHARITIS: LUL 1+ 2+
OS_COMMENTS: MGD 1+ 2+
OD_COMMENTS: MGD 1+ 2+
OD_BLEPHARITIS: RUL 1+ 2+

## 2025-08-08 ASSESSMENT — VISUAL ACUITY
OS_BCVA: 20/60-2
OD_BCVA: 20/40-2

## 2025-08-08 ASSESSMENT — SUPERFICIAL PUNCTATE KERATITIS (SPK)
OS_SPK: T 1+
OD_SPK: T 1+

## 2025-08-08 ASSESSMENT — CORNEAL PTERYGIUM: OS_PTERYGIUM: NASAL 2MM 1MM

## 2025-08-08 ASSESSMENT — TEAR BREAK UP TIME (TBUT)
OS_TBUT: 10 SEC
OD_TBUT: 10 SEC

## (undated) DEVICE — DRAPE STERI-DRAPE INCISE 13X13"

## (undated) DEVICE — GLV 7 PROTEXIS (WHITE)

## (undated) DEVICE — SOL BALANCE SALT 15ML

## (undated) DEVICE — SOL IRR BAL SALT + 500ML

## (undated) DEVICE — CANNULA ALCON SOFT TIP 25G

## (undated) DEVICE — BACKFLUSH SOFT TIP 25G

## (undated) DEVICE — SYE-LASER - CONSTELLATION MACHINE #2 1003466901X: Type: DURABLE MEDICAL EQUIPMENT

## (undated) DEVICE — ILM FORCEP 25G

## (undated) DEVICE — PACK CONSTELLATION POST 25G 20K

## (undated) DEVICE — CANNULA ALCON SOFT TIP 23G

## (undated) DEVICE — VENODYNE/SCD SLEEVE CALF MEDIUM

## (undated) DEVICE — SCRAPER MEMBRANE 23G

## (undated) DEVICE — SUT VICRYL 7-0 12" TG140-8 DA

## (undated) DEVICE — CONSTELLATION TOTAL PLUS PAK 23G

## (undated) DEVICE — LENS VITRECTOMY FLAT

## (undated) DEVICE — ELCTR BIPOLAR CORD BAUSCH & LOMB 12FT DISP

## (undated) DEVICE — WARMING BLANKET LOWER ADULT

## (undated) DEVICE — ILM FORCEP 23G

## (undated) DEVICE — DRAPE MICROSCOPE RESIGHT

## (undated) DEVICE — FLEXLOOP CURVED SCRAPER MEMBRANE 25G

## (undated) DEVICE — ELCTR BIPOLAR CORD J&J 12FT DISP

## (undated) DEVICE — DIATHERMY PROBE 25GA

## (undated) DEVICE — MARKER OPTH STR VISIMARK VIO

## (undated) DEVICE — PACK VITRECTOMY